# Patient Record
(demographics unavailable — no encounter records)

---

## 2024-12-03 NOTE — PHYSICAL EXAM
[de-identified] : L thumb Min swelling Tender MCP No instabiulty  Stiffness No def  Xrays base prox pahalnx fx

## 2024-12-03 NOTE — HISTORY OF PRESENT ILLNESS
[de-identified] : L thumb injury 7 days ago doing burpees at hockey  He has L thumb pain  [8] : 8 [6] : 6 [Dull/Aching] : dull/aching [Ice] : ice [] : yes [FreeTextEntry1] : L hand/ thumb  [FreeTextEntry9] : brace  [de-identified] : activity  [de-identified] : 11/26/24 [de-identified] : [facility]  [de-identified] : xr

## 2024-12-19 NOTE — PHYSICAL EXAM
[de-identified] : L thumb  Min swelling Nontender Min stiffness No instability   Xrays PA/Lateral/Oblique of the thumb healed fx

## 2024-12-19 NOTE — HISTORY OF PRESENT ILLNESS
[6] : 6 [4] : 4 [Dull/Aching] : dull/aching [de-identified] : L thumb is feeling better Injury 3 weeks ago  [FreeTextEntry1] : L thumb  [de-identified] : brace

## 2025-03-06 NOTE — RISK ASSESSMENT
[Clinical Interview] : Clinical Interview [Collateral Sources] : Collateral Sources [No] : No [ADHD] : ADHD [Severe anxiety, agitation or panic] : severe anxiety, agitation or panic [History of Impulsivity] : history of impulsivity [Non-compliant or not receiving treatment] : non-compliant or not receiving treatment [Triggering events leading to humiliation, shame, and/or despair] : triggering events leading to humiliation, shame, and/or despair (e.g. loss of relationship, financial or health status) (real or anticipated) [Identifies reasons for living] : identifies reasons for living [Supportive social network of family or friends] : supportive social network of family or friends [Fear of death/actual act of killing self] : fear of death or the actual act of killing self [Engaged in work or school] : engaged in work or school [Yes (details below)] : yes [None Known] : none known [Yes, within past 3 months] : yes, within past 3 months [Yes, more than 3 months ago] : yes, more than 3 months ago [Impulsivity] : impulsivity [Irritability] : irritability [Residential stability] : residential stability [Relationship stability] : relationship stability [Yes] : yes [TextBox_32] : The patient reported a single, transient suicidal thought involving stabbing himself with a knife. This thought lasted one day and has not recurred. He denies current suicidal ideation and affirms his desire to live. [FreeTextEntry4] : patient reports aggression in the context of wrestling/pushing brothers. [FreeTextEntry5] : Patient reports when upset, he throws objects in his room. he reports this doesn't happen often.  [de-identified] : Patient does not have access to lethal means/weapons

## 2025-03-06 NOTE — DISCUSSION/SUMMARY
[Low acute suicide risk] : Low acute suicide risk [No] : No [Not clinically indicated] : Safety Plan completed/updated (for individuals at risk): Not clinically indicated [FreeTextEntry1] : At present, patient has a low risk of harm to self.  Although patient has risk factors including male gender, history of impulsivity, anxiety, aggression, passive suicidal ideation, patient has significant protective factors including strong family/social support, domiciled, age, lack of prior self-harm, no suicide attempts, no substance use, no melany, no psychosis, no CAH, no psychiatric hospitalization, current willingness to engage in treatment, future orientation with long & short term goals for the future, hopeful, help-seeking, engaged in school & activities, current denial of any SIIP or urges to self-harm, no reported hx of abuse/trauma, no access to guns/family is able to means restrict, no legal history.

## 2025-03-06 NOTE — REASON FOR VISIT
[Behavioral Health Urgent Care Assessment] : a behavioral health urgent care assessment [School] : school [Patient] : patient [Self] : alone [Mother] : with mother [Father] : with father [TextBox_17] : emotional dysregulation

## 2025-03-06 NOTE — RISK ASSESSMENT
[Clinical Interview] : Clinical Interview [Collateral Sources] : Collateral Sources [No] : No [ADHD] : ADHD [Severe anxiety, agitation or panic] : severe anxiety, agitation or panic [History of Impulsivity] : history of impulsivity [Non-compliant or not receiving treatment] : non-compliant or not receiving treatment [Triggering events leading to humiliation, shame, and/or despair] : triggering events leading to humiliation, shame, and/or despair (e.g. loss of relationship, financial or health status) (real or anticipated) [Identifies reasons for living] : identifies reasons for living [Supportive social network of family or friends] : supportive social network of family or friends [Fear of death/actual act of killing self] : fear of death or the actual act of killing self [Engaged in work or school] : engaged in work or school [Yes (details below)] : yes [None Known] : none known [Yes, within past 3 months] : yes, within past 3 months [Yes, more than 3 months ago] : yes, more than 3 months ago [Impulsivity] : impulsivity [Irritability] : irritability [Residential stability] : residential stability [Relationship stability] : relationship stability [Yes] : yes [TextBox_32] : The patient reported a single, transient suicidal thought involving stabbing himself with a knife. This thought lasted one day and has not recurred. He denies current suicidal ideation and affirms his desire to live. [FreeTextEntry4] : patient reports aggression in the context of wrestling/pushing brothers. [FreeTextEntry5] : Patient reports when upset, he throws objects in his room. he reports this doesn't happen often.  [de-identified] : Patient does not have access to lethal means/weapons

## 2025-03-06 NOTE — PLAN
[Provision of National Suicide Prevention Lifeline 1-260-436-TALK (8101)] : Provision of national suicide prevention lifeline 9-124-422-talk (0676) [Patient] : patient [Family] : family [None on Record] : none on record [TextBox_9] : Patient will benefit from connecting to individual therapy. Parents will benefit from connecting to parent training. [TextBox_11] : family stated that they will re-engage with neurologist regarding med tx for ADHD. currently on lexapro 15mg daily for his anxiety, managed by neurologist. family is happy with current physician and is not seeking alternatives [TextBox_13] :  no safety concerns at this time. no guns at home. family should call 911 or go to nearest ER or any acute safety concerns. [TextBox_26] : school referral/signed consent/emailed school

## 2025-03-06 NOTE — ADDENDUM
[FreeTextEntry1] : Patient was seen and examined by me, Dr. Anjali George. I reviewed and agreed with the findings and plan as documented in the LM[facility] note, unless noted below.

## 2025-03-06 NOTE — HISTORY OF PRESENT ILLNESS
[Not Applicable] : Not applicable [FreeTextEntry1] : Patient is a 13y/o male, domiciled with mother, father, and 8y/o twin brothers, currently enrolled at [employer], 7th grade general education with 504, diagnosed with ADHD and anxiety, hx of aggression, med management by neurologist, no h/o outpatient therapy, no prior psychiatric hospitalization, no self-injury or suicide attempts, no substance use, no legal issues, no CPS involvement, no trauma/abuse, no PMH, presenting today with mother and father who was referred by school for emotional dysregulation at home.  [facility] DO met with patient who was polite and cooperative with personal information. Patient reports he believes he is here today due to his aggression, which occurs at home when stressed or angry a few times a week. Triggers typically include his brothers' actions, though his parents can also be triggers. When frustrated, he isolates in his room, and reports at times he throws objects to release anger. He believes his anxiety builds up at school and is released at home. He enjoys hockey, wrestling, football, finishing, and collecting/trading sports cards. Socially, he reports doing well with a couple of close friends, though he finds people, including himself, annoying at times. While he can eventually recognize his wrongdoing, it takes several hours or days due to the intensity of his anger. He reports infrequent (every two months) impulsive expressions of suicidal ideation when extremely angry, though he states he doesn't mean them and denies current suicidal intent. He reported a previous, transient suicidal thought involving stabbing himself, which lasted a day and hasn't recurred; he currently denies suicidal thoughts and affirms his desire to live. Patient reports his appetite is average. He reports he often feels hungry, though his weekday eating habits are poor due to an early lunch at school (10:30 am). Patient reports when he gets home he is too hungry and wants to eat everything. He also reports he is particular about his food preferences. He sleeps well and denies current suicidal ideation and self-injurious behaviors. Patient presents as help-seeking and ambivalent about individual therapy with willingness to try.    [facility] DO obtained collateral information from patient's mother and father. Parents report patient was diagnosed with ADHD and anxiety about three years ago. He currently takes Lexapro 15mg, having previously tried Strattera and Intuniv, one of which caused fatigue and withdrawal, and the other, increased aggression. Parents report patient has a history of both feeding therapy and individual therapy (last time being around the time of COVID-19). Parents report patient was described as a perfectionist when younger and now holds himself to high standards. He struggles significantly with emotional regulation, consistently viewing himself as the victim in situations with both friends and family, externalizing blame, and demonstrating little frustration tolerance, particularly at home. This impatience and demand for immediate gratification ("everything has to be now") has been present since he was three years old. While perceived as a kind and respectful child by others (friends, coaches, teachers), he exhibits explosive outbursts at home, appearing frequently angry with his parents. He reportedly has increasing social difficulties related to not being able to take accountability, despite generally being very social. Academically, he performs well, consistently achieving high honor roll, though he is learning to manage his schoolwork amidst increased distractibility this year. He sleeps well and has a good appetite, though his diet consists of mostly junk food high in sugar, and his picky eating is worsening. He participates in hockey and wrestling. Parents report patient has expressed passive suicidal ideation ("I want to kill myself") during angry outbursts, which his parents address. Parents report these expressions are infrequent. He displays physical aggression, primarily with his brothers (wrestling, pushing, tripping), when frustrated, stopping short of serious violence but intentionally causing distress and at times requiring paternal intervention. He also expresses anger towards his father and at times aggression towards mother described as softly hitting. Parents report he never becomes fully violent, but he tests boundaries. Parents report when patient is upset, he sometimes struggles to articulate the source of his distress and becomes hyperreactive to environmental stimuli, such as others' chewing or breathing. Parents deny suicidal ideation and self-injurious behaviors. They deny acute safety concerns.   [FreeTextEntry2] : See HPI [FreeTextEntry3] : Patient is currently taking Lexapro 15mg.  Patient previously tried Strattera and Intuniv, one of which caused fatigue and withdrawal, and the other, increased aggression

## 2025-03-06 NOTE — HISTORY OF PRESENT ILLNESS
[Not Applicable] : Not applicable [FreeTextEntry1] : Patient is a 13y/o male, domiciled with mother, father, and 8y/o twin brothers, currently enrolled at [employer], 7th grade general education with 504, diagnosed with ADHD and anxiety, hx of aggression, med management by neurologist, no h/o outpatient therapy, no prior psychiatric hospitalization, no self-injury or suicide attempts, no substance use, no legal issues, no CPS involvement, no trauma/abuse, no PMH, presenting today with mother and father who was referred by school for emotional dysregulation at home.  [facility] DO met with patient who was polite and cooperative with personal information. Patient reports he believes he is here today due to his aggression, which occurs at home when stressed or angry a few times a week. Triggers typically include his brothers' actions, though his parents can also be triggers. When frustrated, he isolates in his room, and reports at times he throws objects to release anger. He believes his anxiety builds up at school and is released at home. He enjoys hockey, wrestling, football, finishing, and collecting/trading sports cards. Socially, he reports doing well with a couple of close friends, though he finds people, including himself, annoying at times. While he can eventually recognize his wrongdoing, it takes several hours or days due to the intensity of his anger. He reports infrequent (every two months) impulsive expressions of suicidal ideation when extremely angry, though he states he doesn't mean them and denies current suicidal intent. He reported a previous, transient suicidal thought involving stabbing himself, which lasted a day and hasn't recurred; he currently denies suicidal thoughts and affirms his desire to live. Patient reports his appetite is average. He reports he often feels hungry, though his weekday eating habits are poor due to an early lunch at school (10:30 am). Patient reports when he gets home he is too hungry and wants to eat everything. He also reports he is particular about his food preferences. He sleeps well and denies current suicidal ideation and self-injurious behaviors. Patient presents as help-seeking and ambivalent about individual therapy with willingness to try.    [facility] DO obtained collateral information from patient's mother and father. Parents report patient was diagnosed with ADHD and anxiety about three years ago. He currently takes Lexapro 15mg, having previously tried Strattera and Intuniv, one of which caused fatigue and withdrawal, and the other, increased aggression. Parents report patient has a history of both feeding therapy and individual therapy (last time being around the time of COVID-19). Parents report patient was described as a perfectionist when younger and now holds himself to high standards. He struggles significantly with emotional regulation, consistently viewing himself as the victim in situations with both friends and family, externalizing blame, and demonstrating little frustration tolerance, particularly at home. This impatience and demand for immediate gratification ("everything has to be now") has been present since he was three years old. While perceived as a kind and respectful child by others (friends, coaches, teachers), he exhibits explosive outbursts at home, appearing frequently angry with his parents. He reportedly has increasing social difficulties related to not being able to take accountability, despite generally being very social. Academically, he performs well, consistently achieving high honor roll, though he is learning to manage his schoolwork amidst increased distractibility this year. He sleeps well and has a good appetite, though his diet consists of mostly junk food high in sugar, and his picky eating is worsening. He participates in hockey and wrestling. Parents report patient has expressed passive suicidal ideation ("I want to kill myself") during angry outbursts, which his parents address. Parents report these expressions are infrequent. He displays physical aggression, primarily with his brothers (wrestling, pushing, tripping), when frustrated, stopping short of serious violence but intentionally causing distress and at times requiring paternal intervention. He also expresses anger towards his father and at times aggression towards mother described as softly hitting. Parents report he never becomes fully violent, but he tests boundaries. Parents report when patient is upset, he sometimes struggles to articulate the source of his distress and becomes hyperreactive to environmental stimuli, such as others' chewing or breathing. Parents deny suicidal ideation and self-injurious behaviors. They deny acute safety concerns.   [FreeTextEntry2] : See HPI [FreeTextEntry3] : Patient is currently taking Lexapro 15mg.  Patient previously tried Strattera and Intuniv, one of which caused fatigue and withdrawal, and the other, increased aggression

## 2025-03-06 NOTE — PLAN
[Provision of National Suicide Prevention Lifeline 8-525-106-TALK (0631)] : Provision of national suicide prevention lifeline 6-185-334-talk (2208) [Patient] : patient [Family] : family [None on Record] : none on record [TextBox_9] : Patient will benefit from connecting to individual therapy. Parents will benefit from connecting to parent training. [TextBox_11] : family stated that they will re-engage with neurologist regarding med tx for ADHD. currently on lexapro 15mg daily for his anxiety, managed by neurologist. family is happy with current physician and is not seeking alternatives [TextBox_13] :  no safety concerns at this time. no guns at home. family should call 911 or go to nearest ER or any acute safety concerns. [TextBox_26] : school referral/signed consent/emailed school